# Patient Record
Sex: MALE | Race: WHITE | NOT HISPANIC OR LATINO | URBAN - METROPOLITAN AREA
[De-identification: names, ages, dates, MRNs, and addresses within clinical notes are randomized per-mention and may not be internally consistent; named-entity substitution may affect disease eponyms.]

---

## 2021-09-20 ENCOUNTER — OFFICE VISIT (OUTPATIENT)
Dept: URBAN - METROPOLITAN AREA CLINIC 121 | Facility: CLINIC | Age: 76
End: 2021-09-20

## 2022-06-03 ENCOUNTER — OFFICE VISIT (OUTPATIENT)
Dept: URBAN - METROPOLITAN AREA CLINIC 63 | Facility: CLINIC | Age: 77
End: 2022-06-03

## 2022-07-09 ENCOUNTER — TELEPHONE ENCOUNTER (OUTPATIENT)
Dept: URBAN - METROPOLITAN AREA CLINIC 121 | Facility: CLINIC | Age: 77
End: 2022-07-09

## 2022-07-09 RX ORDER — LORATADINE 5 MG
TABLET,CHEWABLE ORAL AS NEEDED
Refills: 0 | OUTPATIENT
Start: 2017-01-16 | End: 2019-07-17

## 2022-07-09 RX ORDER — IRON/FOLIC ACID/C/B6/B12/ZINC 150-1.25MG
TABLET ORAL ONCE A DAY
Refills: 0 | OUTPATIENT
Start: 2019-07-17 | End: 2022-06-03

## 2022-07-09 RX ORDER — LORATADINE 5 MG
TABLET,CHEWABLE ORAL AS NEEDED
Refills: 0 | OUTPATIENT
Start: 2016-02-10 | End: 2017-01-16

## 2022-07-09 RX ORDER — MULTIVIT-MIN/FA/LYCOPEN/LUTEIN .4-300-25
TABLET ORAL ONCE A DAY
Refills: 0 | OUTPATIENT
Start: 2019-07-17 | End: 2022-06-03

## 2022-07-09 RX ORDER — NIACIN 250 MG
TABLET, EXTENDED RELEASE ORAL ONCE A DAY
Refills: 0 | OUTPATIENT
Start: 2019-07-17 | End: 2022-06-03

## 2022-07-09 RX ORDER — IRON/FOLIC ACID/C/B6/B12/ZINC 150-1.25MG
TABLET ORAL ONCE A DAY
Refills: 0 | OUTPATIENT
Start: 2017-01-16 | End: 2019-07-17

## 2022-07-09 RX ORDER — LORATADINE 5 MG
TABLET,CHEWABLE ORAL AS NEEDED
Refills: 0 | OUTPATIENT
Start: 2019-07-17 | End: 2022-06-03

## 2022-07-09 RX ORDER — MULTIVIT-MIN/FA/LYCOPEN/LUTEIN .4-300-25
TABLET ORAL ONCE A DAY
Refills: 0 | OUTPATIENT
Start: 2017-01-16 | End: 2019-07-17

## 2022-07-09 RX ORDER — PIOGLITAZONE 30 MG/1
TABLET ORAL ONCE A DAY
Refills: 0 | OUTPATIENT
Start: 2022-04-20 | End: 2022-06-03

## 2022-07-09 RX ORDER — PRAVASTATIN SODIUM 20 MG/1
TABLET ORAL ONCE A DAY
Refills: 0 | OUTPATIENT
Start: 2016-02-10 | End: 2017-01-16

## 2022-07-09 RX ORDER — TAMSULOSIN HYDROCHLORIDE 0.4 MG/1
CAPSULE ORAL ONCE A DAY
Refills: 0 | OUTPATIENT
Start: 2017-01-16 | End: 2019-07-17

## 2022-07-09 RX ORDER — MULTIVIT-MIN/FA/LYCOPEN/LUTEIN .4-300-25
TABLET ORAL
Refills: 0 | OUTPATIENT
Start: 2014-06-16 | End: 2016-02-10

## 2022-07-09 RX ORDER — NIACIN 250 MG
TABLET, EXTENDED RELEASE ORAL ONCE A DAY
Refills: 0 | OUTPATIENT
Start: 2016-02-10 | End: 2017-01-16

## 2022-07-09 RX ORDER — TAMSULOSIN HYDROCHLORIDE 0.4 MG/1
CAPSULE ORAL
Refills: 0 | OUTPATIENT
Start: 2014-06-16 | End: 2016-02-10

## 2022-07-09 RX ORDER — PSYLLIUM SEED
PACKET (EA) ORAL ONCE A DAY
Refills: 0 | OUTPATIENT
Start: 2019-07-17 | End: 2019-07-17

## 2022-07-09 RX ORDER — LORATADINE 5 MG
TABLET,CHEWABLE ORAL
Refills: 0 | OUTPATIENT
Start: 2014-11-26 | End: 2016-02-10

## 2022-07-09 RX ORDER — FINASTERIDE 5 MG/1
TABLET, FILM COATED ORAL
Refills: 0 | OUTPATIENT
Start: 2014-06-16 | End: 2016-02-10

## 2022-07-09 RX ORDER — MESALAMINE 1.2 G/1
TABLET, DELAYED RELEASE ORAL
Refills: 0 | OUTPATIENT
Start: 2014-06-16 | End: 2016-02-10

## 2022-07-09 RX ORDER — TAMSULOSIN HYDROCHLORIDE 0.4 MG/1
CAPSULE ORAL ONCE A DAY
Refills: 0 | OUTPATIENT
Start: 2016-02-10 | End: 2017-01-16

## 2022-07-09 RX ORDER — NIACIN 250 MG
TABLET, EXTENDED RELEASE ORAL
Refills: 0 | OUTPATIENT
Start: 2014-06-16 | End: 2016-02-10

## 2022-07-09 RX ORDER — IRON,CARB/FOLATE6/MV-MIN NO.41 150 MG-1MG
TABLET ORAL ONCE A DAY
Refills: 0 | OUTPATIENT
Start: 2016-02-10 | End: 2016-02-10

## 2022-07-09 RX ORDER — NIACIN 250 MG
TABLET, EXTENDED RELEASE ORAL ONCE A DAY
Refills: 0 | OUTPATIENT
Start: 2017-01-16 | End: 2019-07-17

## 2022-07-09 RX ORDER — MULTIVIT-MIN/FA/LYCOPEN/LUTEIN .4-300-25
TABLET ORAL ONCE A DAY
Refills: 0 | OUTPATIENT
Start: 2016-02-10 | End: 2017-01-16

## 2022-07-09 RX ORDER — PRAVASTATIN SODIUM 20 MG/1
TABLET ORAL ONCE A DAY
Refills: 0 | OUTPATIENT
Start: 2017-01-16 | End: 2019-07-17

## 2022-07-09 RX ORDER — PSYLLIUM SEED
PACKET (EA) ORAL ONCE A DAY
Refills: 0 | OUTPATIENT
Start: 2017-01-16 | End: 2019-07-17

## 2022-07-09 RX ORDER — FINASTERIDE 5 MG/1
TABLET, FILM COATED ORAL ONCE A DAY
Refills: 0 | OUTPATIENT
Start: 2016-02-10 | End: 2017-01-16

## 2022-07-09 RX ORDER — FINASTERIDE 5 MG/1
TABLET, FILM COATED ORAL ONCE A DAY
Refills: 0 | OUTPATIENT
Start: 2017-01-16 | End: 2019-07-17

## 2022-07-09 RX ORDER — PRAVASTATIN SODIUM 20 MG/1
TABLET ORAL
Refills: 0 | OUTPATIENT
Start: 2014-06-16 | End: 2016-02-10

## 2022-07-09 RX ORDER — IRON/FOLIC ACID/C/B6/B12/ZINC 150-1.25MG
TABLET ORAL
Refills: 0 | OUTPATIENT
Start: 2014-06-16 | End: 2016-02-10

## 2022-07-10 ENCOUNTER — TELEPHONE ENCOUNTER (OUTPATIENT)
Dept: URBAN - METROPOLITAN AREA CLINIC 121 | Facility: CLINIC | Age: 77
End: 2022-07-10

## 2022-07-10 RX ORDER — NIACIN 250 MG
TABLET, EXTENDED RELEASE ORAL ONCE A DAY
Refills: 0 | Status: ACTIVE | COMMUNITY
Start: 2022-06-03

## 2022-07-10 RX ORDER — IRON,CARB/FOLATE6/MV-MIN NO.41 150 MG-1MG
ONCE A DAY TABLET ORAL ONCE A DAY
Refills: 0 | Status: ACTIVE | COMMUNITY
Start: 2016-02-10

## 2022-07-10 RX ORDER — PRAVASTATIN SODIUM 20 MG/1
TABLET ORAL ONCE A DAY
Refills: 0 | Status: ACTIVE | COMMUNITY
Start: 2019-07-17

## 2022-07-10 RX ORDER — MULTIVIT-MIN/FA/LYCOPEN/LUTEIN .4-300-25
TABLET ORAL ONCE A DAY
Refills: 0 | Status: ACTIVE | COMMUNITY
Start: 2022-06-03

## 2022-07-10 RX ORDER — FINASTERIDE 5 MG/1
TABLET, FILM COATED ORAL ONCE A DAY
Refills: 0 | Status: ACTIVE | COMMUNITY
Start: 2022-06-03

## 2022-07-10 RX ORDER — TAMSULOSIN HYDROCHLORIDE 0.4 MG/1
CAPSULE ORAL ONCE A DAY
Refills: 0 | Status: ACTIVE | COMMUNITY
Start: 2019-07-17

## 2022-07-10 RX ORDER — MESALAMINE 1000 MG/1
SUPPOSITORY RECTAL TWICE A DAY
Refills: 0 | Status: ACTIVE | COMMUNITY
Start: 2014-08-19

## 2022-07-10 RX ORDER — PRAVASTATIN SODIUM 20 MG/1
TABLET ORAL ONCE A DAY
Refills: 0 | Status: ACTIVE | COMMUNITY
Start: 2022-06-03

## 2022-07-10 RX ORDER — NAPROXEN SODIUM 220 MG
TABLET ORAL ONCE A DAY
Refills: 0 | Status: ACTIVE | COMMUNITY
Start: 2022-06-03

## 2022-07-10 RX ORDER — PIOGLITAZONE 30 MG/1
TABLET ORAL ONCE A DAY
Refills: 0 | Status: ACTIVE | COMMUNITY
Start: 2022-06-03

## 2022-07-10 RX ORDER — FINASTERIDE 5 MG/1
TABLET, FILM COATED ORAL ONCE A DAY
Refills: 0 | Status: ACTIVE | COMMUNITY
Start: 2019-07-17

## 2022-07-10 RX ORDER — TAMSULOSIN HYDROCHLORIDE 0.4 MG/1
CAPSULE ORAL ONCE A DAY
Refills: 0 | Status: ACTIVE | COMMUNITY
Start: 2022-06-03

## 2022-07-10 RX ORDER — MESALAMINE 1000 MG/1
SUPPOSITORY RECTAL ONCE A DAY
Refills: 1 | Status: ACTIVE | COMMUNITY
Start: 2014-07-24

## 2022-07-10 RX ORDER — LORATADINE 5 MG
TABLET,CHEWABLE ORAL AS NEEDED
Refills: 0 | Status: ACTIVE | COMMUNITY
Start: 2022-06-03

## 2022-08-02 ENCOUNTER — CLAIMS CREATED FROM THE CLAIM WINDOW (OUTPATIENT)
Dept: URBAN - METROPOLITAN AREA SURGERY CENTER 4 | Facility: SURGERY CENTER | Age: 77
End: 2022-08-02
Payer: MEDICARE

## 2022-08-02 ENCOUNTER — CLAIMS CREATED FROM THE CLAIM WINDOW (OUTPATIENT)
Dept: URBAN - METROPOLITAN AREA CLINIC 4 | Facility: CLINIC | Age: 77
End: 2022-08-02
Payer: MEDICARE

## 2022-08-02 DIAGNOSIS — D12.5 BENIGN NEOPLASM OF SIGMOID COLON: ICD-10-CM

## 2022-08-02 DIAGNOSIS — K63.89 OTHER SPECIFIED DISEASES OF INTESTINE: ICD-10-CM

## 2022-08-02 DIAGNOSIS — Z12.11 ENCOUNTER FOR SCREENING FOR MALIGNANT NEOPLASM OF COLON: ICD-10-CM

## 2022-08-02 DIAGNOSIS — K63.3 COLON ULCER: ICD-10-CM

## 2022-08-02 DIAGNOSIS — K57.30 DVRTCLOS OF LG INT W/O PERFORATION OR ABSCESS W/O BLEEDING: ICD-10-CM

## 2022-08-02 PROCEDURE — 88305 TISSUE EXAM BY PATHOLOGIST: CPT | Performed by: PATHOLOGY

## 2022-08-02 PROCEDURE — 45380 COLONOSCOPY AND BIOPSY: CPT | Performed by: CLINIC/CENTER

## 2022-08-02 PROCEDURE — 45380 COLONOSCOPY AND BIOPSY: CPT | Performed by: INTERNAL MEDICINE

## 2022-08-02 PROCEDURE — G8918 PT W/O PREOP ORDER IV AB PRO: HCPCS | Performed by: INTERNAL MEDICINE

## 2022-08-02 PROCEDURE — G8907 PT DOC NO EVENTS ON DISCHARG: HCPCS | Performed by: INTERNAL MEDICINE

## 2022-08-02 PROCEDURE — G8907 PT DOC NO EVENTS ON DISCHARG: HCPCS | Performed by: CLINIC/CENTER

## 2022-08-02 PROCEDURE — G8918 PT W/O PREOP ORDER IV AB PRO: HCPCS | Performed by: CLINIC/CENTER

## 2022-08-02 RX ORDER — PIOGLITAZONE 30 MG/1
TABLET ORAL ONCE A DAY
Refills: 0 | Status: ACTIVE | COMMUNITY
Start: 2022-06-03

## 2022-08-02 RX ORDER — PRAVASTATIN SODIUM 20 MG/1
TABLET ORAL ONCE A DAY
Refills: 0 | Status: ACTIVE | COMMUNITY
Start: 2019-07-17

## 2022-08-02 RX ORDER — LORATADINE 5 MG
TABLET,CHEWABLE ORAL AS NEEDED
Refills: 0 | Status: ACTIVE | COMMUNITY
Start: 2022-06-03

## 2022-08-02 RX ORDER — FINASTERIDE 5 MG/1
TABLET, FILM COATED ORAL ONCE A DAY
Refills: 0 | Status: ACTIVE | COMMUNITY
Start: 2022-06-03

## 2022-08-02 RX ORDER — MULTIVIT-MIN/FA/LYCOPEN/LUTEIN .4-300-25
TABLET ORAL ONCE A DAY
Refills: 0 | Status: ACTIVE | COMMUNITY
Start: 2022-06-03

## 2022-08-02 RX ORDER — TAMSULOSIN HYDROCHLORIDE 0.4 MG/1
CAPSULE ORAL ONCE A DAY
Refills: 0 | Status: ACTIVE | COMMUNITY
Start: 2022-06-03

## 2022-08-02 RX ORDER — IRON,CARB/FOLATE6/MV-MIN NO.41 150 MG-1MG
ONCE A DAY TABLET ORAL ONCE A DAY
Refills: 0 | Status: ACTIVE | COMMUNITY
Start: 2016-02-10

## 2022-08-02 RX ORDER — MESALAMINE 1000 MG/1
SUPPOSITORY RECTAL ONCE A DAY
Refills: 1 | Status: ACTIVE | COMMUNITY
Start: 2014-07-24

## 2022-08-02 RX ORDER — NIACIN 250 MG
TABLET, EXTENDED RELEASE ORAL ONCE A DAY
Refills: 0 | Status: ACTIVE | COMMUNITY
Start: 2022-06-03

## 2022-08-02 RX ORDER — NAPROXEN SODIUM 220 MG
TABLET ORAL ONCE A DAY
Refills: 0 | Status: ACTIVE | COMMUNITY
Start: 2022-06-03

## 2023-04-27 ENCOUNTER — TELEPHONE ENCOUNTER (OUTPATIENT)
Dept: URBAN - METROPOLITAN AREA CLINIC 7 | Facility: CLINIC | Age: 78
End: 2023-04-27

## 2023-04-28 ENCOUNTER — TELEPHONE ENCOUNTER (OUTPATIENT)
Dept: URBAN - METROPOLITAN AREA CLINIC 64 | Facility: CLINIC | Age: 78
End: 2023-04-28

## 2023-05-01 ENCOUNTER — OFFICE VISIT (OUTPATIENT)
Dept: URBAN - METROPOLITAN AREA CLINIC 60 | Facility: CLINIC | Age: 78
End: 2023-05-01
Payer: MEDICARE

## 2023-05-01 ENCOUNTER — WEB ENCOUNTER (OUTPATIENT)
Dept: URBAN - METROPOLITAN AREA CLINIC 60 | Facility: CLINIC | Age: 78
End: 2023-05-01

## 2023-05-01 VITALS
HEIGHT: 65 IN | WEIGHT: 122 LBS | DIASTOLIC BLOOD PRESSURE: 68 MMHG | TEMPERATURE: 99.5 F | OXYGEN SATURATION: 95 % | HEART RATE: 111 BPM | BODY MASS INDEX: 20.33 KG/M2 | RESPIRATION RATE: 12 BRPM | SYSTOLIC BLOOD PRESSURE: 122 MMHG

## 2023-05-01 DIAGNOSIS — L02.31 CUTANEOUS ABSCESS OF BUTTOCK: ICD-10-CM

## 2023-05-01 PROCEDURE — 99214 OFFICE O/P EST MOD 30 MIN: CPT | Performed by: PHYSICIAN ASSISTANT

## 2023-05-01 RX ORDER — NIACIN 250 MG
1 TABLET WITH FOOD TABLET ORAL ONCE A DAY
Status: ACTIVE | COMMUNITY

## 2023-05-01 RX ORDER — METRONIDAZOLE 500 MG/1
1 TABLET TABLET ORAL THREE TIMES A DAY
Qty: 30 TABLET | Refills: 0 | OUTPATIENT
Start: 2023-05-01 | End: 2023-05-11

## 2023-05-01 RX ORDER — NICOTINE 14MG/24HR
AS DIRECTED PATCH, TRANSDERMAL 24 HOURS TRANSDERMAL
Status: ACTIVE | COMMUNITY

## 2023-05-01 RX ORDER — CYCLOBENZAPRINE HYDROCHLORIDE 5 MG/1
TABLET, FILM COATED ORAL
Qty: 180 TABLET | Status: ACTIVE | COMMUNITY

## 2023-05-01 RX ORDER — TAMSULOSIN HYDROCHLORIDE 0.4 MG/1
TAKE 1 CAPSULE BY MOUTH EVERY DAY CAPSULE ORAL
Qty: 90 EACH | Refills: 1 | Status: ACTIVE | COMMUNITY

## 2023-05-01 RX ORDER — PRAVASTATIN SODIUM 20 MG/1
TAKE 1 TABLET BY MOUTH EVERY DAY TABLET ORAL
Qty: 90 EACH | Refills: 2 | Status: ACTIVE | COMMUNITY

## 2023-05-01 RX ORDER — POLYETHYLENE GLYCOL 3350 17 G/17G
AS DIRECTED POWDER, FOR SOLUTION ORAL
Status: ACTIVE | COMMUNITY

## 2023-05-01 RX ORDER — PIOGLITAZONE 30 MG/1
TAKE 1 TABLET BY MOUTH EVERY DAY TABLET ORAL
Qty: 90 EACH | Refills: 0 | Status: ACTIVE | COMMUNITY

## 2023-05-01 RX ORDER — FINASTERIDE 5 MG/1
TABLET, FILM COATED ORAL
Qty: 90 TABLET | Status: ACTIVE | COMMUNITY

## 2023-05-01 NOTE — HPI-TODAY'S VISIT:
Leif is a very pleasant 77 year old male with severe scoliosis who presents for evaluation of emely- anal leakage. He has a home health nurse at this time for wound care. He has healing skin infections  on his left ankle and right thigh which are followed by Dr. Allison. He was taking Keflex and bactroban ointment which has improved his skin infections. In January he was hospitalized for complicated UTI. At discharge he went to rehab hospital because of debility. For the past month his nurse noticed drainage on his sheets. He placed a paper towel  under him over night and collected the drainage. He states he has a tender area just above the anus that he thinks is draining. He denies any diarrhea or constipation or hematochezia or dark stools. He denies any nausea or vomiting.

## 2023-05-01 NOTE — PHYSICAL EXAM CONSTITUTIONAL:
short-statured, thin man with profound scoliosis,  in no acute distress , ambulating without difficulty using arm crutches and wearing heavily platformed shoe on left foot, normal communication ability

## 2023-05-01 NOTE — PHYSICAL EXAM RECTAL:
no melena, no red blood, has hypertrophic skin around the anus, rectum has light brown soft stool, just proximal has a swollen area which is ulcerated, and very tender and draining. The drainage appears fecal vs purulence.

## 2023-05-09 ENCOUNTER — OFFICE VISIT (OUTPATIENT)
Dept: URBAN - METROPOLITAN AREA CLINIC 60 | Facility: CLINIC | Age: 78
End: 2023-05-09
Payer: MEDICARE

## 2023-05-09 ENCOUNTER — DASHBOARD ENCOUNTERS (OUTPATIENT)
Age: 78
End: 2023-05-09

## 2023-05-09 VITALS
DIASTOLIC BLOOD PRESSURE: 80 MMHG | SYSTOLIC BLOOD PRESSURE: 132 MMHG | WEIGHT: 122 LBS | TEMPERATURE: 97.9 F | BODY MASS INDEX: 20.33 KG/M2 | HEIGHT: 65 IN | HEART RATE: 125 BPM | OXYGEN SATURATION: 96 %

## 2023-05-09 DIAGNOSIS — L02.818 CUTANEOUS ABSCESS OF OTHER SITE: ICD-10-CM

## 2023-05-09 DIAGNOSIS — L98.8 FISTULA: ICD-10-CM

## 2023-05-09 PROBLEM — 31928004: Status: ACTIVE | Noted: 2023-05-09

## 2023-05-09 PROCEDURE — 99213 OFFICE O/P EST LOW 20 MIN: CPT | Performed by: PHYSICIAN ASSISTANT

## 2023-05-09 PROCEDURE — 99203 OFFICE O/P NEW LOW 30 MIN: CPT | Performed by: PHYSICIAN ASSISTANT

## 2023-05-09 RX ORDER — NICOTINE 14MG/24HR
AS DIRECTED PATCH, TRANSDERMAL 24 HOURS TRANSDERMAL
Status: ACTIVE | COMMUNITY

## 2023-05-09 RX ORDER — NIACIN 250 MG
1 TABLET WITH FOOD TABLET ORAL ONCE A DAY
Status: ACTIVE | COMMUNITY

## 2023-05-09 RX ORDER — POLYETHYLENE GLYCOL 3350 17 G/17G
AS DIRECTED POWDER, FOR SOLUTION ORAL
Status: ACTIVE | COMMUNITY

## 2023-05-09 RX ORDER — CYCLOBENZAPRINE HYDROCHLORIDE 5 MG/1
TABLET, FILM COATED ORAL
Qty: 180 TABLET | Status: ACTIVE | COMMUNITY

## 2023-05-09 RX ORDER — FINASTERIDE 5 MG/1
TABLET, FILM COATED ORAL
Qty: 90 TABLET | Status: ACTIVE | COMMUNITY

## 2023-05-09 RX ORDER — PIOGLITAZONE 30 MG/1
TAKE 1 TABLET BY MOUTH EVERY DAY TABLET ORAL
Qty: 90 EACH | Refills: 0 | Status: ACTIVE | COMMUNITY

## 2023-05-09 RX ORDER — TAMSULOSIN HYDROCHLORIDE 0.4 MG/1
TAKE 1 CAPSULE BY MOUTH EVERY DAY CAPSULE ORAL
Qty: 90 EACH | Refills: 1 | Status: ACTIVE | COMMUNITY

## 2023-05-09 RX ORDER — PRAVASTATIN SODIUM 20 MG/1
TAKE 1 TABLET BY MOUTH EVERY DAY TABLET ORAL
Qty: 90 EACH | Refills: 2 | Status: ACTIVE | COMMUNITY

## 2023-05-09 RX ORDER — METRONIDAZOLE 500 MG/1
1 TABLET TABLET ORAL THREE TIMES A DAY
Qty: 30 TABLET | Refills: 0 | Status: ACTIVE | COMMUNITY
Start: 2023-05-01 | End: 2023-05-11

## 2023-05-09 NOTE — PHYSICAL EXAM GASTROINTESTINAL
Rectal: hypertrophic skin around the anus. Just at the dorsal aspect of the anus there is a pinhole with drainage that appears purulent/fecal. The skin is without erythema or swelling or blanching.

## 2023-05-09 NOTE — HPI-HPI
Leif is taking metronidazole for one week. He continues to have drainage from his emely-anal area. He places a folded paper towel at the gluteal cleft and it collects drainage that looks like fecal material. (He saved the paper towels and brought to clinic in ziplock bag) Patient denies any pain or itching or irritation.  Denies any fever or chills. He does complain his urine appears darker since being on the antibiotic.